# Patient Record
Sex: MALE | Race: WHITE
[De-identification: names, ages, dates, MRNs, and addresses within clinical notes are randomized per-mention and may not be internally consistent; named-entity substitution may affect disease eponyms.]

---

## 2017-12-20 ENCOUNTER — HOSPITAL ENCOUNTER (EMERGENCY)
Dept: HOSPITAL 43 - DL.ED | Age: 77
Discharge: HOME | End: 2017-12-20
Payer: MEDICARE

## 2017-12-20 DIAGNOSIS — Z87.891: ICD-10-CM

## 2017-12-20 DIAGNOSIS — J44.1: Primary | ICD-10-CM

## 2017-12-20 LAB
CHLORIDE SERPL-SCNC: 101 MMOL/L (ref 101–111)
SODIUM SERPL-SCNC: 138 MMOL/L (ref 135–145)

## 2017-12-20 NOTE — EDM.PDOC
ED HPI GENERAL MEDICAL PROBLEM





- General


Chief Complaint: Respiratory Problem


Stated Complaint: [id number] SENT FROM DR SANDERSON


Time Seen by Provider: 12/20/17 14:20


Source of Information: Reports: Patient


History Limitations: Reports: No Limitations





- History of Present Illness


INITIAL COMMENTS - FREE TEXT/NARRATIVE: 





This 78 yo male patient was sent to the ED by Dr. Seals due to increased 

shortness of breath with exertion and COPD. The patient went to the [facility] and was found to have an oxygen saturation of 88%. The patient reports 

he was at the clinic for a refill of his nebulizer medications. The patient was 

initially reluctant to have any testing done here, but was convinced of the 

importance of all of the testing. 


Onset: Unknown/Unsure


Duration: Week(s):, Constant


Location: Reports: Generalized


Quality: Reports: Other


Severity: Moderate


Improves with: Reports: Rest


Worsens with: Reports: Movement


Associated Symptoms: Reports: Shortness of Breath





- Related Data


 Allergies











Allergy/AdvReac Type Severity Reaction Status Date / Time


 


No Known Allergies Allergy   Verified 12/20/17 14:03











Home Meds: 


 Home Meds





Albuterol [Proventil Neb Soln] 2.5 mg NEB ONETIME PRN 12/20/17 [History]











Past Medical History


HEENT History: Reports: Impaired Vision


Other HEENT History: wears glasses


Cardiovascular History: Reports: None


Respiratory History: Reports: COPD


Gastrointestinal History: Reports: None


Genitourinary History: Reports: None


Musculoskeletal History: Reports: Arthritis


Neurological History: Reports: None


Psychiatric History: Reports: None


Endocrine/Metabolic History: Reports: None


Hematologic History: Reports: None


Immunologic History: Reports: None


Oncologic (Cancer) History: Reports: None


Dermatologic History: Reports: None





- Infectious Disease History


Infectious Disease History: Reports: Chicken Pox, Measles, Mumps





- Past Surgical History


Head Surgeries/Procedures: Reports: None





Social & Family History





- Tobacco Use


Smoking Status *Q: Former Smoker


Years of Tobacco use: 50


Packs/Tins Daily: 2


Used Tobacco, but Quit: Yes


Month Tobacco Last Used: january


Second Hand Smoke Exposure: No





- Caffeine Use


Caffeine Use: Reports: Coffee





- Recreational Drug Use


Recreational Drug Use: No





ED ROS GENERAL





- Review of Systems


Review Of Systems: ROS reveals no pertinent complaints other than HPI.





ED EXAM, GENERAL





- Physical Exam


Exam: See Below


Exam Limited By: No Limitations


General Appearance: Alert, WD/WN, Mild Distress, Obese


Eye Exam: Bilateral Eye: EOMI, Normal Inspection, PERRL


Ears: Normal External Exam, Normal Canal, Hearing Grossly Normal, Normal TMs


Nose: Normal Inspection, Normal Mucosa, No Blood


Throat/Mouth: Normal Inspection, Normal Lips, Normal Teeth, Normal Gums, Normal 

Oropharynx, Normal Voice, No Airway Compromise


Head: Atraumatic, Normocephalic


Neck: Normal Inspection, Supple, Non-Tender, Full Range of Motion


Respiratory/Chest: Decreased Breath Sounds, Rhonchi (diffuse)


Cardiovascular: Normal Peripheral Pulses, Regular Rate, Rhythm, No Edema, No 

Gallop, No JVD, No Murmur, No Rub


GI/Abdominal: Normal Bowel Sounds, Soft, Non-Tender, No Organomegaly, No 

Distention, No Abnormal Bruit, No Mass, Other (obese)


 (Male) Exam: Deferred


Rectal (Males) Exam: Deferred


Back Exam: Normal Inspection, Full Range of Motion, NT


Extremities: Normal Inspection, Normal Range of Motion, Non-Tender, Normal 

Capillary Refill, No Pedal Edema


Neurological: Alert, Oriented, CN II-XII Intact, Normal Cognition, Normal Gait, 

Normal Reflexes, No Motor/Sensory Deficits


Psychiatric: Normal Affect, Normal Mood


Skin Exam: Warm, Dry, Intact, Normal Color, No Rash


Lymphatic: No Adenopathy





Course





- Vital Signs


Last Recorded V/S: 


 Last Vital Signs











Temp  37.1 C   12/20/17 15:35


 


Pulse  89   12/20/17 15:35


 


Resp  14   12/20/17 15:35


 


BP  146/79 H  12/20/17 15:35


 


Pulse Ox  94 L  12/20/17 15:35














- Orders/Labs/Meds


Orders: 


 Active Orders 24 hr











 Category Date Time Status


 


 EKG Documentation Completion [RC] URGENT Care  12/20/17 14:25 Active


 


 Chest 1V Frontal [CR] Urgent Exams  12/20/17 14:25 Taken











Labs: 


 Laboratory Tests











  12/20/17 12/20/17 Range/Units





  14:35 14:35 


 


WBC  8.4   (5.0-10.0)  10^3/uL


 


RBC  5.35   (4.6-6.2)  10^6/uL


 


Hgb  15.9   (14.0-18.0)  g/dL


 


Hct  48.0   (40.0-54.0)  %


 


MCV  89.7   ()  fL


 


MCH  29.7   (27.0-34.0)  pg


 


MCHC  33.1   (33.0-35.0)  g/dL


 


Plt Count  143 L   (150-450)  10^3/uL


 


Neut % (Auto)  70.8   (42.2-75.2)  %


 


Lymph % (Auto)  18.8 L   (20.5-50.1)  %


 


Mono % (Auto)  6.9   (2-8)  %


 


Eos % (Auto)  3.1 H   (1.0-3.0)  %


 


Baso % (Auto)  0.4   (0.0-1.0)  %


 


Sodium   138  (135-145)  mmol/L


 


Potassium   4.7  (3.6-5.0)  mmol/L


 


Chloride   101  (101-111)  mmol/L


 


Carbon Dioxide   30.0  (21.0-31.0)  mmol/L


 


Anion Gap   11.7  


 


BUN   21 H  (7-18)  mg/dL


 


Creatinine   1.3  (0.6-1.3)  mg/dL


 


Est Cr Clr Drug Dosing   46.83  mL/min


 


Estimated GFR (MDRD)   53  


 


BUN/Creatinine Ratio   16.15  


 


Glucose   96  ()  mg/dL


 


Calcium   9.2  (8.4-10.2)  mg/dl


 


Total Bilirubin   0.5  (0.2-1.0)  mg/dL


 


AST   33  (10-42)  IU/L


 


ALT   30  (10-60)  IU/L


 


Alkaline Phosphatase   81  ()  IU/L


 


Troponin I   < 0.02  (0.00-0.02)  ng/ml


 


Total Protein   7.0  (6.7-8.2)  g/dl


 


Albumin   4.2  (3.2-5.5)  g/dl


 


Globulin   2.8  


 


Albumin/Globulin Ratio   1.50  














Departure





- Departure


Time of Disposition: 15:38


Disposition: Home, Self-Care 01


Condition: Fair


Clinical Impression: 


 COPD exacerbation








- Discharge Information


Instructions:  Chronic Obstructive Pulmonary Disease Exacerbation, Easy-to-Read


Forms:  ED Department Discharge


Care Plan Goals: 


The patient and wife were advised of the examination, lab, EKG and x-ray 

results during the visit. The patient was discharged with scripts for 1) 

Prednisone (20 mg) #10 to take 2 by mouth daily for 5 days, 2) Augmentin (500/

125) #14 to take 1 by mouth 2 times per day for 7 days, and 3) Albuterol Neb 

Solution (2.5/3) # 1 box to have 1 treatment up to 4 times per day. The patient 

should follow-up with his primary care facility for continued evaluation and 

further treatment. If the patient has any additional symptoms or concerns, the 

patient should follow-up with his primary care facility or return to the 

emergency department. 





- My Orders


Last 24 Hours: 


My Active Orders





12/20/17 14:25


EKG Documentation Completion [RC] URGENT 


Chest 1V Frontal [CR] Urgent 














- Assessment/Plan


Last 24 Hours: 


My Active Orders





12/20/17 14:25


EKG Documentation Completion [RC] URGENT 


Chest 1V Frontal [CR] Urgent

## 2017-12-20 NOTE — CR
Clinical history: 77-year-old male complaining of dyspnea on exertion. 

 

Interpretation: 

 

Generalized mild shaggy accentuation central lung markings but....

 no peribronchial "cuffing", air trapping or focal lobar pneumonia.

 

Normal cardiac silhouette without alveolar edema or dependent effusion.

 

No lung mass or hilar lymphadenopathy. (Symmetrically prominent proximal pulmonary artery segments)

 

No atelectasis/collapse. No pneumothorax.

 

CONCLUSION: No acute new cardiopulmonary abnormality since CT exam 23 June 2017.

## 2017-12-21 NOTE — EKG
12/20/2017 - LISS FIORE P -

 

FINDINGS:  I reviewed the EKG and agree with the machine's reading.

 

DD:  12/21/2017 16:06:26

DT:  12/21/2017 16:24:42

[facility]

Job #:  [id number]

## 2019-02-15 ENCOUNTER — HOSPITAL ENCOUNTER (OUTPATIENT)
Dept: HOSPITAL 43 - DL.ENDO | Age: 79
Discharge: HOME | End: 2019-02-15
Attending: SURGERY
Payer: MEDICARE

## 2019-02-15 DIAGNOSIS — D12.5: ICD-10-CM

## 2019-02-15 DIAGNOSIS — Z87.891: ICD-10-CM

## 2019-02-15 DIAGNOSIS — J44.9: ICD-10-CM

## 2019-02-15 DIAGNOSIS — K64.8: ICD-10-CM

## 2019-02-15 DIAGNOSIS — K63.5: ICD-10-CM

## 2019-02-15 DIAGNOSIS — K57.30: ICD-10-CM

## 2019-02-15 DIAGNOSIS — K59.00: Primary | ICD-10-CM

## 2019-02-15 NOTE — OR
DATE:  02/15/2019

 

PREOPERATIVE DIAGNOSES:  Bowel function change with constipation.

 

POSTOPERATIVE DIAGNOSES:  Bowel function change with constipation.

 

PROCEDURES:  Total colonoscopy with snare removal of 3 sigmoid polyps.

 

ANESTHESIA:  Conscious sedation with IV Versed and fentanyl.

 

SPECIMEN:  Polyp x3.

 

OPERATIVE FINDINGS:  This patient had 3 medium-sized polyps in the sigmoid colon

and some minimal sigmoid diverticulosis.  Otherwise, the exam is normal.

 

RECOMMENDATION:  Probable no further need for colonoscopy secondary to the

patient's age and medical status.

 

INDICATION FOR PROCEDURE:  This 78-year-old male was referred by RADHA Matthews,

for evaluation of constipation.  The patient has somewhat difficult time getting

stool started, and occasionally, he has urgency with frequent stool evacuation.

He has no rectal bleeding.  By physical examination, he does have a prolapsed

internal hemorrhoid.

 

PROCEDURE IN DETAIL:  After adequate preparation, a colonoscope was inserted

into the rectum.  This was easily passed all the way to the cecum.  Confirmation

of the cecum was made by visualization of the ileocecal valve on palpation in

the right lower quadrant.  A photograph of the ICV was taken.  The bowel prep

was good.  On withdrawal, the only abnormality noted was 3 pedunculated polyps

all in the sigmoid colon.  These were snared with a device, and using cautery,

were clipped off.  There was no hemostasis required.  He does have some sigmoid

diverticulosis also, but these are very minimal.  Of

note, in the rectum, he does have internal hemorrhoids.  Air was suctioned from

the colon, and the scope was removed.

 

DD:  02/15/2019 09:13:21

DT:  02/15/2019 09:37:36

[facility]

Job #:  [id number]